# Patient Record
Sex: FEMALE | Employment: OTHER | URBAN - METROPOLITAN AREA
[De-identification: names, ages, dates, MRNs, and addresses within clinical notes are randomized per-mention and may not be internally consistent; named-entity substitution may affect disease eponyms.]

---

## 2017-04-28 ENCOUNTER — OFFICE VISIT (OUTPATIENT)
Dept: HEMATOLOGY | Age: 62
End: 2017-04-28

## 2017-04-28 VITALS
SYSTOLIC BLOOD PRESSURE: 139 MMHG | TEMPERATURE: 96.9 F | BODY MASS INDEX: 29.57 KG/M2 | OXYGEN SATURATION: 96 % | WEIGHT: 188.38 LBS | HEIGHT: 67 IN | DIASTOLIC BLOOD PRESSURE: 85 MMHG | HEART RATE: 86 BPM

## 2017-04-28 DIAGNOSIS — B18.2 CHRONIC HEPATITIS C WITHOUT HEPATIC COMA (HCC): Primary | ICD-10-CM

## 2017-04-28 PROBLEM — I10 HYPERTENSION: Status: ACTIVE | Noted: 2017-04-28

## 2017-04-28 PROBLEM — N20.0 KIDNEY STONES: Status: ACTIVE | Noted: 2017-04-28

## 2017-04-28 RX ORDER — LOSARTAN POTASSIUM AND HYDROCHLOROTHIAZIDE 25; 100 MG/1; MG/1
1 TABLET ORAL DAILY
COMMUNITY
Start: 2017-04-25

## 2017-04-28 RX ORDER — METOPROLOL TARTRATE 25 MG/1
25 TABLET, FILM COATED ORAL 2 TIMES DAILY
COMMUNITY
Start: 2017-02-02

## 2017-04-28 RX ORDER — ALPRAZOLAM 0.25 MG/1
0.25 TABLET ORAL
COMMUNITY
Start: 2017-03-23

## 2017-04-28 NOTE — PROGRESS NOTES
93 Micky Valencia MD, [address]     April Maritza Bennett, SAYDA Cruz MD, [address], [place] Val Mukherjee NP Joyice Daring, NP [facility]     [address] [facility], [address] Oniel Isaac  22.     [phone]     FAX: [address] [place] [address]     [address], [address]     98 Maria Fernanda Zuluaga, [address]     [phone]     FAX: [phone]         Patient Care Team:  Feli Duncan MD as PCP - General (Family Practice)      Problem List  Date Reviewed: 4/28/2017          Codes Class Noted    Chronic hepatitis C [facility]) ICD-10-CM: B18.2  ICD-9-CM: 070.54  4/28/2017        Hypertension ICD-10-CM: I10  ICD-9-CM: 401.9  4/28/2017        Kidney stones ICD-10-CM: N20.0  ICD-9-CM: 592.0  4/28/2017                The physicians listed above have asked me to see Juan Gloria in consultation regarding chronic HCV and its management. All medical records sent by the referring physicians were reviewed     The patient is a 62 y.o.  female who tested positive for chronic HCV in 2006 when she donated blood. Risk factors for acquiring HCV are inhaling cocaine in 1970s. There was no history of acute incteric hepatitis at the time of these risk factors. Imaging of the liver was not recently performed. The patient was referred to see me at [facility] in the 2006. A liver biopsy was performed in 2006 and 2008. The results are not available at this time. The patient was treated with peginterferon and ribavirin in 2006. There was a non-response. In about 2008 she was enrolled in the phase 2 telaprevir clinical trial.  She responded but then relapsed.     The most recent laboratory studies indicate that the liver transaminases are elevated, alkaline phosphatase is normal, tests of hepatic synthetic and metabolic function are normal, and the platelet count is normal.      The patient has no symptoms which can be attributed to the liver disorder. The patient has not experienced fatigue, pain in the right side over the liver,     The patient completes all daily activities without any functional limitations. ALLERGIES  Allergies   Allergen Reactions    Latex Rash    Clindamycin Rash    Sulfa (Sulfonamide Antibiotics) Rash       MEDICATIONS  Current Outpatient Prescriptions   Medication Sig    ALPRAZolam (XANAX) 0.25 mg tablet     losartan-hydroCHLOROthiazide (HYZAAR) 100-25 mg per tablet     metoprolol tartrate (LOPRESSOR) 25 mg tablet      No current facility-administered medications for this visit. SYSTEM REVIEW NOT RELATED TO LIVER DISEASE OR REVIEWED ABOVE:  Constitution systems: Negative for fever, chills, weight gain, weight loss. Eyes: Negative for visual changes. ENT: Negative for sore throat, painful swallowing. Respiratory: Negative for cough, hemoptysis, SOB. Cardiology: Negative for chest pain, palpitations. GI:  Negative for constipation or diarrhea. : Negative for urinary frequency, dysuria, hematuria, nocturia. Skin: Negative for rash. Hematology: Negative for easy bruising, blood clots. Musculo-skelatal: Negative for back pain, muscle pain, weakness. Neurologic: Negative for headaches, dizziness, vertigo, memory problems not related to HE. Psychology: Negative for anxiety, depression. FAMILY HISTORY:  The father  of MI. The mother has the following chronic diseases: heart disease. There is no family history of liver disease. SOCIAL HISTORY:  The patient is . The patient has 1 child, 1 stepchild and 9 grandchildren. The patient stopped using tobacco products in . The patient consumes 3 alcoholic beverages per week. 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         PHYSICAL EXAMINATION:  /85  Pulse 86  Temp 96.9 °F (36.1 °C) (Tympanic) Ht 5' 7\" (1.702 m)  Wt 188 lb 6 oz (85.4 kg)  SpO2 96%  BMI 29.5 kg/m2  General: No acute distress. Eyes: Sclera anicteric. ENT: No oral lesions. Thyroid normal.  Nodes: No adenopathy. Skin: No spider angiomata. No jaundice. No palmar erythema. Respiratory: Lungs clear to auscultation. Cardiovascular: Regular heart rate. No murmurs. No JVD. Abdomen: Soft non-tender. Liver size normal to percussion/palpation. Spleen not palpable. No obvious ascites. Extremities: No edema. No muscle wasting. No gross arthritic changes. Neurologic: Alert and oriented. Cranial nerves grossly intact. No asterixis. LABORATORY STUDIES:  From 3/2017  AST/ALT/ALP/T Bili/ALB:  105/193/85/0.8/4.3  WBC/HB/PLT/INR:  6.1/14.0/270  BUN/CREAT:  15/0.6    SEROLOGIES:  Not available or performed. Testing was performed today. LIVER HISTOLOGY:  Not available or performed    ENDOSCOPIC PROCEDURES:  Not available or performed    RADIOLOGY:  Not available or performed    OTHER TESTING:  Not available or performed    ASSESSMENT AND PLAN:  Chronic HCV of unclear severity. Liver function is normal.  The platelet count is normal.      Will perform laboratory testing to monitor liver function and degree of liver injury. This included BMP, hepatic panel, CBC with platelet count,     Will perform and/or review results of HCV viral load and HCV genotype to define the specific treatment and duration of treatment that will be required. Will perform serologic and virologic studies to assess for other causes of chronic liver disease. Will perform imaging of the liver with ultrasound. The need to perform an assessment of liver fibrosis was discussed with the patient. Liver biopsy has been used to assess the degree of fibrosis and the need for HCV treatment. The risks of performing the liver biopsy including pain, puncture of the lung, gallbladder, intestine or kidney and bleeding were discussed.       The Fibroscan can assess liver fibrosis and determine if a patient has advanced fibrosis or cirrhosis without the need for liver biopsy. The Fibroscan is currently available at [facility] [address]. This will be scheduled. The patient was previously treated for HCV with peginterferon and ribavirin. There was a non-response that was not clearly defined. She was then treated with PEGINF, RBV and Telaprevir in the phase 2 clinical trial.  She had a virologic response with breakthrough or relapsed. Given that she was in the telaprevir clinical trial she must be HCV genotype 1. Discussed the treatment alternatives. The SVR/cure rate for HCV now exceeds 90% with just oral anti-viral therapy and no interferon injections or significant side effects for most patients with HCV. The specific treatment is dependent upon genotype, viral load and histology. The patient should be treated with Michael Lima or [employer]. The SVR/cure rate for either of these regimens exceeds 95%. The patient was directed to continue all current medications at the current dosages. There are no contraindications for the patient to take any medications that are necessary for treatment of other medical issues. The patient was counseled regarding alcohol consumption. The need for vaccination against viral hepatitis A and B will be assessed with serologic and instituted as appropriate. All of the above issues were discussed with the patient. All questions were answered. The patient expressed a clear understanding of the above. Follow-up [facility] [address] [facility] for [address], and to initiate HCV treatment.     Kain Hsieh MD  [facility] [address] [address] [facility] [address] [facility] [place]  22.  [phone]

## 2017-04-28 NOTE — MR AVS SNAPSHOT
Visit Information Date & Time Provider Department Dept. Phone Encounter #  
 4/28/2017  3:30 PM Rizwana Perry MD [facility] [address] [place] [address] [id number] Follow-up Instructions Return for FS with April. Upcoming Health Maintenance Date Due Hepatitis C Screening 1955 DTaP/Tdap/Td series (1 - Tdap) 6/19/1977 PAP AKA CERVICAL CYTOLOGY 6/19/1977 BREAST CANCER SCRN MAMMOGRAM 6/19/2006 FOBT Q 1 YEAR AGE 50-75 6/19/2006 ZOSTER VACCINE AGE 60> 6/19/2016 INFLUENZA AGE 9 TO ADULT 8/1/2016 Allergies as of 4/28/2017  Never Reviewed Severity Noted Reaction Type Reactions Latex  04/28/2017    Rash Clindamycin  04/28/2017    Rash  
 Sulfa (Sulfonamide Antibiotics)  04/28/2017    Rash Current Immunizations  Never Reviewed No immunizations on file. Not reviewed this visit You Were Diagnosed With   
  
 Codes Comments Chronic hepatitis C without hepatic coma (HCC)    -  Primary ICD-10-CM: B18.2 ICD-9-CM: 070.54 Vitals BP Pulse Temp Height(growth percentile) Weight(growth percentile) SpO2  
 139/85 86 96.9 °F (36.1 °C) (Tympanic) 5' 7\" (1.702 m) 188 lb 6 oz (85.4 kg) 96% BMI Smoking Status 29.5 kg/m2 Never Smoker BMI and BSA Data Body Mass Index Body Surface Area  
 29.5 kg/m 2 2.01 m 2 Your Updated Medication List  
  
   
This list is accurate as of: 4/28/17  4:11 PM.  Always use your most recent med list.  
  
  
  
  
 ALPRAZolam 0.25 mg tablet Commonly known as:  XANAX  
  
 losartan-hydroCHLOROthiazide 100-25 mg per tablet Commonly known as:  HYZAAR  
  
 metoprolol tartrate 25 mg tablet Commonly known as:  LOPRESSOR We Performed the Following CBC WITH AUTOMATED DIFF [78101 CPT(R)] HCV NS5A DRUG RESISTANCE ASSAY [[id number] Custom] HCV RNA BY ZEINAB QL,RFLX TO QT [63146 CPT(R)] HEP A AB, TOTAL F283496 CPT(R)] HEP B SURFACE AB [id number] CPT(R)] HEP B SURFACE AG [id number] CPT(R)] HEP C GENOTYPE [id number] CPT(R)] HEPATIC FUNCTION PANEL [40320 CPT(R)] HEPATITIS B CORE AB, TOTAL [id number] CPT(R)] METABOLIC PANEL, BASIC [46653 CPT(R)] Follow-up Instructions Return for FS with April. To-Do List   
 04/28/2017 Imaging:  US ABD LTD   
  
 05/25/2017 11:00 AM  
  Appointment with Ramona Woodall NP at [facility] ([phone]) Introducing [facility] & HEALTH SERVICES! [employer] introduces [employer] patient portal. Now you can access parts of your medical record, email your doctor's office, and request medication refills online. 1. In your internet browser, go to [email or web address] 2. Click on the First Time User? Click Here link in the Sign In box. You will see the New Member Sign Up page. 3. Enter your [employer] Access Code exactly as it appears below. You will not need to use this code after youve completed the sign-up process. If you do not sign up before the expiration date, you must request a new code. · [employer] Access Code: [email or web address] Expires: 7/27/2017  4:06 PM 
 
4. Enter the last four digits of your Social Security Number (xxxx) and Date of Birth (mm/dd/yyyy) as indicated and click Submit. You will be taken to the next sign-up page. 5. Create a [facility] ID. This will be your [employer]zeda login ID and cannot be changed, so think of one that is secure and easy to remember. 6. Create a [employer] password. You can change your password at any time. 7. Enter your Password Reset Question and Answer. This can be used at a later time if you forget your password. 8. Enter your e-mail address. You will receive e-mail notification when new information is available in [address] 9. Click Sign Up. You can now view and download portions of your medical record.  
10. Click the Download Summary menu link to download a portable copy of your medical information. If you have questions, please visit the Frequently Asked Questions section of the [employer] website. Remember, [employer] is NOT to be used for urgent needs. For medical emergencies, dial 911. Now available from your iPhone and Android! Please provide this summary of care documentation to your next provider. Your primary care clinician is listed as Mabel Farooq. If you have any questions after today's visit, please call [phone].

## 2017-04-29 LAB
ALBUMIN SERPL-MCNC: 4.6 G/DL (ref 3.6–4.8)
ALP SERPL-CCNC: 100 IU/L (ref 39–117)
ALT SERPL-CCNC: 212 IU/L (ref 0–32)
AST SERPL-CCNC: 138 IU/L (ref 0–40)
BASOPHILS # BLD AUTO: 0 X10E3/UL (ref 0–0.2)
BASOPHILS NFR BLD AUTO: 0 %
BILIRUB DIRECT SERPL-MCNC: 0.25 MG/DL (ref 0–0.4)
BILIRUB SERPL-MCNC: 0.6 MG/DL (ref 0–1.2)
BUN SERPL-MCNC: 18 MG/DL (ref 8–27)
BUN/CREAT SERPL: 24 (ref 12–28)
CALCIUM SERPL-MCNC: 10 MG/DL (ref 8.7–10.3)
CHLORIDE SERPL-SCNC: 94 MMOL/L (ref 96–106)
CO2 SERPL-SCNC: 23 MMOL/L (ref 18–29)
CREAT SERPL-MCNC: 0.74 MG/DL (ref 0.57–1)
EOSINOPHIL # BLD AUTO: 0.2 X10E3/UL (ref 0–0.4)
EOSINOPHIL NFR BLD AUTO: 2 %
ERYTHROCYTE [DISTWIDTH] IN BLOOD BY AUTOMATED COUNT: 12.7 % (ref 12.3–15.4)
GLUCOSE SERPL-MCNC: 86 MG/DL (ref 65–99)
HAV AB SER QL IA: POSITIVE
HBV CORE AB SERPL QL IA: NEGATIVE
HBV SURFACE AB SER QL: NON REACTIVE
HBV SURFACE AG SERPL QL IA: NEGATIVE
HCT VFR BLD AUTO: 42.9 % (ref 34–46.6)
HGB BLD-MCNC: 14.5 G/DL (ref 11.1–15.9)
IMM GRANULOCYTES # BLD: 0 X10E3/UL (ref 0–0.1)
IMM GRANULOCYTES NFR BLD: 0 %
LYMPHOCYTES # BLD AUTO: 3.1 X10E3/UL (ref 0.7–3.1)
LYMPHOCYTES NFR BLD AUTO: 39 %
MCH RBC QN AUTO: 33.5 PG (ref 26.6–33)
MCHC RBC AUTO-ENTMCNC: 33.8 G/DL (ref 31.5–35.7)
MCV RBC AUTO: 99 FL (ref 79–97)
MONOCYTES # BLD AUTO: 0.8 X10E3/UL (ref 0.1–0.9)
MONOCYTES NFR BLD AUTO: 10 %
NEUTROPHILS # BLD AUTO: 3.8 X10E3/UL (ref 1.4–7)
NEUTROPHILS NFR BLD AUTO: 49 %
PLATELET # BLD AUTO: 328 X10E3/UL (ref 150–379)
POTASSIUM SERPL-SCNC: 4 MMOL/L (ref 3.5–5.2)
PROT SERPL-MCNC: 7.4 G/DL (ref 6–8.5)
RBC # BLD AUTO: 4.33 X10E6/UL (ref 3.77–5.28)
SODIUM SERPL-SCNC: 136 MMOL/L (ref 134–144)
WBC # BLD AUTO: 8 X10E3/UL (ref 3.4–10.8)

## 2017-05-01 LAB
HCV GENTYP SERPL NAA+PROBE: NORMAL
HCV RNA SERPL NAA+PROBE-ACNC: NORMAL IU/ML
HCV RNA SERPL NAA+PROBE-LOG IU: 6.59 LOG10 IU/ML
HCV RNA SERPL QL NAA+PROBE: POSITIVE
PLEASE NOTE, 550474: NORMAL
TEST INFORMATION: NORMAL

## 2017-05-08 LAB
HCV NS5 MUT DET ISLT GENOTYP: NORMAL
HCV RESIS PANELL ISLT GENOTYP: NORMAL
REF LAB TEST METHOD: NORMAL

## 2017-05-25 ENCOUNTER — OFFICE VISIT (OUTPATIENT)
Dept: HEMATOLOGY | Age: 62
End: 2017-05-25

## 2017-05-25 VITALS
BODY MASS INDEX: 29.48 KG/M2 | HEART RATE: 64 BPM | DIASTOLIC BLOOD PRESSURE: 84 MMHG | OXYGEN SATURATION: 95 % | SYSTOLIC BLOOD PRESSURE: 145 MMHG | TEMPERATURE: 95.7 F | WEIGHT: 188.2 LBS

## 2017-05-25 DIAGNOSIS — B18.2 CHRONIC HEPATITIS C WITHOUT HEPATIC COMA (HCC): Primary | ICD-10-CM

## 2017-05-25 RX ORDER — LEDIPASVIR AND SOFOSBUVIR 90; 400 MG/1; MG/1
1 TABLET, FILM COATED ORAL DAILY
Qty: 28 TAB | Refills: 2 | Status: SHIPPED | OUTPATIENT
Start: 2017-05-25 | End: 2017-09-12 | Stop reason: ALTCHOICE

## 2017-05-25 NOTE — PROGRESS NOTES
[address] Lisseth Cerrato MD, [address]     April Dustin Sindi, SAYDA Rios MD, [address], [place], [place]     [address], [place] Mora NP        [address], [address] Oniel Isaac  22.     [phone]     FAX: [address] [place] [address] Drive, [address] [facility], [address]     [phone]     FAX: [phone]     Patient Care Team:  Bernard Steele MD as PCP - General (Family Practice)    Patient Active Problem List   Diagnosis Code    Hypertension I10    Kidney stones N20.0    Chronic hepatitis C without hepatic coma ([facility] [address] B18.2       Radha Fernandez returns to the [place] [address] regarding chronic HCV and its management. The active problem list, all pertinent past medical history, medications, radiologic findings and laboratory findings related to the liver disorder were reviewed with the patient. The patient is a 62 y.o.  female who tested positive for chronic HCV in 2006 when she donated blood. Risk factors for acquiring HCV are inhaling cocaine in 1970s. The patient had been followed at [facility] since 2006. A liver biopsy was performed in 2006 and 2008. The results are not available at this time. The patient was treated with peginterferon and ribavirin in 2006. There was a non-response. In about 2008, she was enrolled in the phase 2 telaprevir clinical trial. She responded but then relapsed. Patient returns to the clinic today to discuss current HCV treatment options. Patient presents to the clinic today for a Fibroscan to assess liver fibrosis or scarring. The patient has no symptoms which can be attributed to the liver disorder. Today, patient denies abdominal pain, change in bowel habits, dark urine, myalgias, arthralgias, pruritus and problems concentrating.  The patient completes all daily activities without any functional limitations. ALLERGIES  Allergies   Allergen Reactions    Latex Rash    Clindamycin Rash    Sulfa (Sulfonamide Antibiotics) Rash     MEDICATIONS  Current Outpatient Prescriptions   Medication Sig    ALPRAZolam (XANAX) 0.25 mg tablet Take 0.25 mg by mouth daily as needed.  losartan-hydroCHLOROthiazide (HYZAAR) 100-25 mg per tablet Take 1 Tab by mouth daily.  metoprolol tartrate (LOPRESSOR) 25 mg tablet Take 25 mg by mouth two (2) times a day. No current facility-administered medications for this visit. SYSTEM REVIEW NOT RELATED TO LIVER DISEASE OR REVIEWED ABOVE:  Constitution systems: Negative for fever, chills, weight gain, weight loss. Eyes: Negative for visual changes. ENT: Negative for sore throat, painful swallowing. Respiratory: Negative for cough, hemoptysis, SOB. Cardiology: Negative for chest pain, palpitations. GI:  Negative for constipation or diarrhea. : Negative for urinary frequency, dysuria, hematuria, nocturia. Skin: Negative for rash. Hematology: Negative for easy bruising, blood clots. Musculo-skelatal: Negative for back pain, muscle pain, weakness. Neurologic: Negative for headaches, dizziness, vertigo, memory problems not related to HE. Psychology: Negative for anxiety, depression. FAMILY HISTORY:  The father  of MI. The mother has the following chronic diseases: heart disease. There is no family history of liver disease. SOCIAL HISTORY:  The patient is . The patient has 1 child, 1 stepchild and 9 grandchildren. The patient stopped using tobacco products in . The patient consumes 3 alcoholic beverages per week. 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 PHYSICAL EXAMINATION:  /84  Pulse 64  Temp 95.7 °F (35.4 °C) (Tympanic)   Wt 188 lb 3.2 oz (85.4 kg)  SpO2 95%  BMI 29.48 kg/m2  General: No acute distress. Eyes: Sclera anicteric. ENT: No oral lesions. Thyroid normal.  Nodes: No adenopathy. Skin: No spider angiomata. No jaundice. No palmar erythema. Respiratory: Lungs clear to auscultation. Cardiovascular: Regular heart rate. No murmurs. No JVD. Abdomen: Soft non-tender. Liver size normal to percussion/palpation. Spleen not palpable. No obvious ascites. Extremities: No edema. No muscle wasting. No gross arthritic changes. Neurologic: Alert and oriented. Cranial nerves grossly intact. No asterixis. LABORATORY STUDIES:  L[facility] [place] of [address] [place] [address] Units 4/28/2017   WBC 3.4 - 10.8 x10E3/uL 8.0   ANC 1.4 - 7.0 x10E3/uL 3.8   HGB 11.1 - 15.9 g/dL 14.5    - 379 x10E3/uL 328   AST 0 - 40 IU/L 138 (H)   ALT 0 - 32 IU/L 212 (H)   Alk Phos 39 - 117 IU/L 100   Bili, Total 0.0 - 1.2 mg/dL 0.6   Bili, Direct 0.00 - 0.40 mg/dL 0.25   Albumin 3.6 - 4.8 g/dL 4.6   BUN 8 - 27 mg/dL 18   Creat 0.57 - 1.00 mg/dL 0.74   Na 134 - 144 mmol/L 136   K 3.5 - 5.2 mmol/L 4.0   Cl 96 - 106 mmol/L 94 (L)   CO2 18 - 29 mmol/L 23   Glucose 65 - 99 mg/dL 86     From 3/2017  AST/ALT/ALP/T Bili/ALB:  105/193/85/0.8/4.3  WBC/HB/PLT/INR:  6.1/14.0/270  BUN/CREAT:  15/0.6    SEROLOGIES:  Serologies Latest Ref Rng & Units 4/28/2017   Hep A Ab, Total Negative Positive (A)   Hep B Surface Ag Negative Negative   Hep B Core Ab, Total Negative Negative   Hep B Surface AB QL  Non Reactive   Hep C Genotype  1a   HCV RT-PCR, Quant IU/mL [id number]     LIVER HISTOLOGY:  5/25/2017. FibroScan performed at [address]. EkPa was 7.0. Suggested fibrosis level is F1.    ENDOSCOPIC PROCEDURES:  Not available or performed    RADIOLOGY:  Not available or performed    OTHER TESTING:  Not available or performed    ASSESSMENT AND PLAN:  Chronic HCV with portal fibrosis. This was confirmed with FibroScan today. Results were discussed in detail with patient. Liver function is normal. The platelet count is normal.      HCV treatment.  Relapser to PEG/RBV/TEL treatment in the past. The patient has HCV genotype 1A with no RASs. Discussed treatment alternatives. Recommended treatment with sofosbuvir/ledipasvir for 12 weeks. Side effects, cure rate, visit schedule and duration of therapy was discussed thoroughly with patient today (25 minutes total, more than half the office visit). Andrew Chafe will be ordered. The patient was directed to continue all current medications at the current dosages. There are no contraindications for the patient to take any medications that are necessary for treatment of other medical issues. The patient was counseled regarding alcohol consumption. The need for vaccination against viral hepatitis A and B will be assessed with serologic and instituted as appropriate. All of the above issues were discussed with the patient. All questions were answered. The patient expressed a clear understanding of the above. [address] [place] [address] in 4 weeks after initiating HCV treatment.     Miguel Mcdonnell NP  [address] [address]  Ph: [phone]  Fax: [phone]

## 2017-07-11 ENCOUNTER — OFFICE VISIT (OUTPATIENT)
Dept: HEMATOLOGY | Age: 62
End: 2017-07-11

## 2017-07-11 VITALS
OXYGEN SATURATION: 91 % | DIASTOLIC BLOOD PRESSURE: 75 MMHG | SYSTOLIC BLOOD PRESSURE: 143 MMHG | HEART RATE: 68 BPM | BODY MASS INDEX: 28.88 KG/M2 | TEMPERATURE: 95.5 F | WEIGHT: 184.4 LBS

## 2017-07-11 DIAGNOSIS — B18.2 CHRONIC HEPATITIS C WITHOUT HEPATIC COMA (HCC): Primary | ICD-10-CM

## 2017-07-11 NOTE — MR AVS SNAPSHOT
Visit Information Date & Time Provider Department Dept. Phone Encounter #  
 7/11/2017  3:00 PM April Agustin Quezada NP [facility] [address] [place] [address] [id number] Follow-up Instructions Return in about 2 months (around 9/11/2017). Upcoming Health Maintenance Date Due Pneumococcal 19-64 Medium Risk (1 of 1 - PPSV23) 6/19/1975 DTaP/Tdap/Td series (1 - Tdap) 6/19/1977 PAP AKA CERVICAL CYTOLOGY 6/19/1977 BREAST CANCER SCRN MAMMOGRAM 6/19/2006 FOBT Q 1 YEAR AGE 50-75 6/19/2006 ZOSTER VACCINE AGE 60> 6/19/2016 INFLUENZA AGE 9 TO ADULT 8/1/2017 Allergies as of 7/11/2017  Review Complete On: 7/11/2017 By: Madeleine Miller Severity Noted Reaction Type Reactions Latex  04/28/2017    Rash Clindamycin  04/28/2017    Rash  
 Sulfa (Sulfonamide Antibiotics)  04/28/2017    Rash Current Immunizations  Never Reviewed No immunizations on file. Not reviewed this visit You Were Diagnosed With   
  
 Codes Comments Chronic hepatitis C without hepatic coma (HCC)    -  Primary ICD-10-CM: B18.2 ICD-9-CM: 070.54 Vitals BP Pulse Temp Weight(growth percentile) SpO2 BMI  
 143/75 68 95.5 °F (35.3 °C) (Oral) 184 lb 6.4 oz (83.6 kg) 91% 28.88 kg/m2 Smoking Status Never Smoker BMI and BSA Data Body Mass Index Body Surface Area  
 28.88 kg/m 2 1.99 m 2 Preferred Pharmacy Pharmacy Name Phone [address]. [address] [place] [address] Rd) - [address] [place] [address] [phone] Your Updated Medication List  
  
   
This list is accurate as of: 7/11/17  3:24 PM.  Always use your most recent med list.  
  
  
  
  
 ALPRAZolam 0.25 mg tablet Commonly known as:  Leshayes Biggsawls Take 0.25 mg by mouth daily as needed. ledipasvir-sofosbuvir  mg Tab Commonly known as:  Bijan Fischer Take 1 Tab by mouth daily.  Indications: CHRONIC HEPATITIS C - GENOTYPE 1  
  
 losartan-hydroCHLOROthiazide 100-25 mg per tablet Commonly known as:  HYZAAR Take 1 Tab by mouth daily. metoprolol tartrate 25 mg tablet Commonly known as:  LOPRESSOR Take 25 mg by mouth two (2) times a day. We Performed the Following HCV RNA BY ZEINAB QL,RFLX TO QT [12927 CPT(R)] HEPATIC FUNCTION PANEL [02187 CPT(R)] Follow-up Instructions Return in about 2 months (around 9/11/2017). Introducing [facility] & HEALTH SERVICES! Tina Saucedo introduces [employer] patient portal. Now you can access parts of your medical record, email your doctor's office, and request medication refills online. 1. In your internet browser, go to [email or web address] 2. Click on the First Time User? Click Here link in the Sign In box. You will see the New Member Sign Up page. 3. Enter your [employer] Access Code exactly as it appears below. You will not need to use this code after youve completed the sign-up process. If you do not sign up before the expiration date, you must request a new code. · [employer] Access Code: [email or web address] Expires: 7/27/2017  4:06 PM 
 
4. Enter the last four digits of your Social Security Number (xxxx) and Date of Birth (mm/dd/yyyy) as indicated and click Submit. You will be taken to the next sign-up page. 5. Create a [employer] ID. This will be your [employer] login ID and cannot be changed, so think of one that is secure and easy to remember. 6. Create a [employer] password. You can change your password at any time. 7. Enter your Password Reset Question and Answer. This can be used at a later time if you forget your password. 8. Enter your e-mail address. You will receive e-mail notification when new information is available in [address] 9. Click Sign Up. You can now view and download portions of your medical record. 10. Click the Download Summary menu link to download a portable copy of your medical information. If you have questions, please visit the Frequently Asked Questions section of the [employer] website. Remember, [employer] is NOT to be used for urgent needs. For medical emergencies, dial 911. Now available from your iPhone and Android! Please provide this summary of care documentation to your next provider. Your primary care clinician is listed as Mabel Farooq. If you have any questions after today's visit, please call [phone].

## 2017-07-12 LAB
ALBUMIN SERPL-MCNC: 4.4 G/DL (ref 3.6–4.8)
ALP SERPL-CCNC: 88 IU/L (ref 39–117)
ALT SERPL-CCNC: 55 IU/L (ref 0–32)
AST SERPL-CCNC: 38 IU/L (ref 0–40)
BILIRUB DIRECT SERPL-MCNC: 0.14 MG/DL (ref 0–0.4)
BILIRUB SERPL-MCNC: 0.4 MG/DL (ref 0–1.2)
PROT SERPL-MCNC: 7 G/DL (ref 6–8.5)

## 2017-07-12 NOTE — PROGRESS NOTES
[address] Lisseth Cerrato MD, VIRGINIA Howell PA-C Atlee Fontana, NP        at [address], [id number] [place] [address], [address]     [phone]     FAX: [phone]    at [address], [address] [place] [address], [address]     [phone]     FAX: [phone]     Patient Care Team:  Triston Cobian MD as PCP - General (Family Practice)    Patient Active Problem List   Diagnosis Code    Hypertension I10    Kidney stones N20.0    [facility] ([facility] B18.2       Alber Rodriguez returns to the [place] [address] regarding chronic HCV and its management. The active problem list, all pertinent past medical history, medications, radiologic findings and laboratory findings related to the liver disorder were reviewed with the patient. The patient is a 65 y.o.  female who tested positive for chronic HCV in 2006 when she donated blood. Risk factors for acquiring HCV are inhaling cocaine in 1970s. Patient is currently undergoing HCV treatment with Harvoni x 4 weeks (started-6/7/2017). She has done well on treatment with no significant side effects. She has missed no medications since starting treatment. She returns to the clinic today to monitor her response to treatment. The patient has no symptoms which can be attributed to the liver disorder. Today, patient denies abdominal pain, change in bowel habits, dark urine, myalgias, arthralgias, pruritus and problems concentrating. The patient completes all daily activities without any functional limitations.     ALLERGIES  Allergies   Allergen Reactions    Latex Rash    Clindamycin Rash    Sulfa (Sulfonamide Antibiotics) Rash     MEDICATIONS  Current Outpatient Prescriptions   Medication Sig    ledipasvir-sofosbuvir (HARVONI)  mg tab Take 1 Tab by mouth daily. Indications: CHRONIC HEPATITIS C - GENOTYPE 1    ALPRAZolam (XANAX) 0.25 mg tablet Take 0.25 mg by mouth daily as needed.  losartan-hydroCHLOROthiazide (HYZAAR) 100-25 mg per tablet Take 1 Tab by mouth daily.  metoprolol tartrate (LOPRESSOR) 25 mg tablet Take 25 mg by mouth two (2) times a day. No current facility-administered medications for this visit. SYSTEM REVIEW NOT RELATED TO LIVER DISEASE OR REVIEWED ABOVE:  Constitution systems: Negative for fever, chills, weight gain, weight loss. Eyes: Negative for visual changes. ENT: Negative for sore throat, painful swallowing. Respiratory: Negative for cough, hemoptysis, SOB. Cardiology: Negative for chest pain, palpitations. GI:  Negative for constipation or diarrhea. : Negative for urinary frequency, dysuria, hematuria, nocturia. Skin: Negative for rash. Hematology: Negative for easy bruising, blood clots. Musculo-skelatal: Negative for back pain, muscle pain, weakness. Neurologic: Negative for headaches, dizziness, vertigo, memory problems not related to HE. Psychology: Negative for anxiety, depression. FAMILY HISTORY:  The father  of MI. The mother has the following chronic diseases: heart disease. There is no family history of liver disease. SOCIAL HISTORY:  The patient is . The patient has 1 child, 1 stepchild and 9 grandchildren. The patient stopped using tobacco products in . The patient consumes 3 alcoholic beverages per week. 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 PHYSICAL EXAMINATION:  /75  Pulse 68  Temp 95.5 °F (35.3 °C) (Oral)   Wt 184 lb 6.4 oz (83.6 kg)  SpO2 91%  BMI 28.88 kg/m2  General: No acute distress. Eyes: Sclera anicteric. ENT: No oral lesions. Thyroid normal.  Nodes: No adenopathy. Skin: No spider angiomata. No jaundice. No palmar erythema. Respiratory: Lungs clear to auscultation.    Cardiovascular: Regular heart rate.  No murmurs. No JVD. Abdomen: Soft non-tender. Liver size normal to percussion/palpation. Spleen not palpable. No obvious ascites. Extremities: No edema. No muscle wasting. No gross arthritic changes. Neurologic: Alert and oriented. Cranial nerves grossly intact. No asterixis. LABORATORY STUDIES:  Liver [facility] Ref Rng & Units 7/11/2017 4/28/2017   WBC 3.4 - 10.8 x10E3/uL  8.0   ANC 1.4 - 7.0 x10E3/uL  3.8   HGB 11.1 - 15.9 g/dL  14.5    - 379 x10E3/uL  328   AST 0 - 40 IU/L 38 138 (H)   ALT 0 - 32 IU/L 55 (H) 212 (H)   Alk Phos 39 - 117 IU/L 88 100   Bili, Total 0.0 - 1.2 mg/dL 0.4 0.6   Bili, Direct 0.00 - 0.40 mg/dL 0.14 0.25   Albumin 3.6 - 4.8 g/dL 4.4 4.6   BUN 8 - 27 mg/dL  18   Creat 0.57 - 1.00 mg/dL  0.74   Na 134 - 144 mmol/L  136   K 3.5 - 5.2 mmol/L  4.0   Cl 96 - 106 mmol/L  94 (L)   CO2 18 - 29 mmol/L  23   Glucose 65 - 99 mg/dL  86     SEROLOGIES:  Serologies Latest Ref Rng & Units 4/28/2017   Hep A Ab, Total Negative Positive (A)   Hep B Surface Ag Negative Negative   Hep B Core Ab, Total Negative Negative   Hep B Surface AB QL  Non Reactive   Hep C Genotype  1a   HCV RT-PCR, Quant IU/mL [id number]     LIVER HISTOLOGY:  5/25/2017. FibroScan performed at [address]. EkPa was 7.0. Suggested fibrosis level is F1.    ENDOSCOPIC PROCEDURES:  Not available or performed    RADIOLOGY:  Not available or performed    OTHER TESTING:  Not available or performed    ASSESSMENT AND PLAN:  Chronic HCV with portal fibrosis. Liver function is normal. The platelet count is normal.      HCV treatment. Relapser to PEG/RBV/TEL treatment in the past. Currently on treatment with Harvoni x 4 weeks. She is tolerating treatment well. Will review HCV RNA when available to monitor response to treatment. She will complete 12 weeks of therapy. The patient was directed to continue all current medications at the current dosages.  There are no contraindications for the patient to take any medications that are necessary for treatment of other medical issues. The patient was counseled regarding alcohol consumption. The need for vaccination against viral hepatitis A and B will be assessed with serologic and instituted as appropriate. All of the above issues were discussed with the patient. All questions were answered. The patient expressed a clear understanding of the above. [address] [place] [address] in 2 months.     Todd Palacio NP  [address] [place] [address], [address] [place] [address]  Ph: [phone]  Fax: [phone]

## 2017-07-14 LAB
HCV RNA SERPL NAA+PROBE-ACNC: <15 IU/ML
HCV RNA SERPL NAA+PROBE-LOG IU: NORMAL LOG10 IU/ML
HCV RNA SERPL QL NAA+PROBE: POSITIVE
TEST INFORMATION: NORMAL

## 2017-09-12 ENCOUNTER — OFFICE VISIT (OUTPATIENT)
Dept: HEMATOLOGY | Age: 62
End: 2017-09-12

## 2017-09-12 VITALS
HEART RATE: 66 BPM | TEMPERATURE: 97.4 F | DIASTOLIC BLOOD PRESSURE: 61 MMHG | SYSTOLIC BLOOD PRESSURE: 117 MMHG | WEIGHT: 188.6 LBS | OXYGEN SATURATION: 96 % | BODY MASS INDEX: 29.54 KG/M2

## 2017-09-12 DIAGNOSIS — B18.2 CHRONIC HEPATITIS C WITHOUT HEPATIC COMA (HCC): Primary | ICD-10-CM

## 2017-09-12 NOTE — PROGRESS NOTES
[address] MD, [place], [place] is[place], [place]       VIRGINIA Cevallos PA-C Katharine Keepers, [facility]   VIRGINIA Yancey NP        at [address], [address] [address]     [phone]     FAX: [phone]    at [address], [address] [facility], [address]     [phone]     FAX: [phone]     Patient Care Team:  Jj Couch MD as PCP - General (Family Practice)    Patient Active Problem List   Diagnosis Code    Hypertension I10    Kidney stones N20.0    Chronic hepatitis C without hepatic coma ([facility] B18.2       Alisha Francois returns to the [place] [address] regarding chronic HCV and its management. The active problem list, all pertinent past medical history, medications, radiologic findings and laboratory findings related to the liver disorder were reviewed with the patient. The patient is a 65 y.o.  female who tested positive for chronic HCV in 2006 when she donated blood. Risk factors for acquiring HCV are inhaling cocaine in 1970s. Patient completed 12 weeks of HCV re-treatment with Harvoni (6/2017-9/2017). She did well on treatment with no significant side effects. She missed no medications throughout treatment. She returns to the clinic today to monitor her response to treatment. The patient has no symptoms which can be attributed to the liver disorder. Today, patient denies abdominal pain, change in bowel habits, dark urine, myalgias, arthralgias, pruritus and problems concentrating. The patient completes all daily activities without any functional limitations.     ALLERGIES  Allergies   Allergen Reactions    Latex Rash    Clindamycin Rash    Sulfa (Sulfonamide Antibiotics) Rash     MEDICATIONS  Current Outpatient Prescriptions   Medication Sig    ALPRAZolam (XANAX) 0.25 mg tablet Take 0.25 mg by mouth daily as needed.  losartan-hydroCHLOROthiazide (HYZAAR) 100-25 mg per tablet Take 1 Tab by mouth daily.  metoprolol tartrate (LOPRESSOR) 25 mg tablet Take 25 mg by mouth two (2) times a day. No current facility-administered medications for this visit. SYSTEM REVIEW NOT RELATED TO LIVER DISEASE OR REVIEWED ABOVE:  Constitution systems: Negative for fever, chills, weight gain, weight loss. Eyes: Negative for visual changes. ENT: Negative for sore throat, painful swallowing. Respiratory: Negative for cough, hemoptysis, SOB. Cardiology: Negative for chest pain, palpitations. GI:  Negative for constipation or diarrhea. : Negative for urinary frequency, dysuria, hematuria, nocturia. Skin: Negative for rash. Hematology: Negative for easy bruising, blood clots. Musculo-skelatal: Negative for back pain, muscle pain, weakness. Neurologic: Negative for headaches, dizziness, vertigo, memory problems not related to HE. Psychology: Negative for anxiety, depression. FAMILY HISTORY:  The father  of MI. The mother has the following chronic diseases: heart disease. There is no family history of liver disease. SOCIAL HISTORY:  The patient is . The patient has 1 child, 1 stepchild and 9 grandchildren. The patient stopped using tobacco products in . The patient consumes 3 alcoholic beverages per week. 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 PHYSICAL EXAMINATION:  /61  Pulse 66  Temp 97.4 °F (36.3 °C) (Tympanic)   Wt 188 lb 9.6 oz (85.5 kg)  SpO2 96%  BMI 29.54 kg/m2  General: No acute distress. Eyes: Sclera anicteric. ENT: No oral lesions. Thyroid normal.  Nodes: No adenopathy. Skin: No spider angiomata. No jaundice. No palmar erythema. Respiratory: Lungs clear to auscultation. Cardiovascular: Regular heart rate. No murmurs. No JVD. Abdomen: Soft non-tender.  Liver size normal to percussion/palpation. Spleen not palpable. No obvious ascites. Extremities: No edema. No muscle wasting. No gross arthritic changes. Neurologic: Alert and oriented. Cranial nerves grossly intact. No asterixis. LABORATORY STUDIES:  Liver [place] [facility] [address] [place] [address] & Units 9/12/2017 7/11/2017    - 379 x10E3/uL     AST 0 - 40 IU/L 27 38   ALT 0 - 32 IU/L 39 (H) 55 (H)   Alk Phos 39 - 117 IU/L 92 88   Bili, Total 0.0 - 1.2 mg/dL 0.4 0.4   Bili, Direct 0.00 - 0.40 mg/dL 0.12 0.14   Albumin 3.6 - 4.8 g/dL 4.3 4.4   BUN 8 - 27 mg/dL     Creat 0.57 - 1.00 mg/dL     Na 134 - 144 mmol/L     K 3.5 - 5.2 mmol/L     Cl 96 - 106 mmol/L     CO2 18 - 29 mmol/L     Glucose 65 - 99 mg/dL     Virology Latest Ref Rng & Units  7/11/2017   HCV RNA, ZEINAB, QL Negative  Positive (A)     A hepatic function panel and HCV RNA were ordered today. Will review results when available. SEROLOGIES:  Serologies Latest Ref Rng & Units 4/28/2017   Hep A Ab, Total Negative Positive (A)   Hep B Surface Ag Negative Negative   Hep B Core Ab, Total Negative Negative   Hep B Surface AB QL  Non Reactive   Hep C Genotype  1a   HCV RT-PCR, Quant IU/mL [id number]     LIVER HISTOLOGY:  5/25/2017. FibroScan performed at [facility]. EkPa was 7.0. Suggested fibrosis level is F1.    ENDOSCOPIC PROCEDURES:  Not available or performed    RADIOLOGY:  Not available or performed    OTHER TESTING:  Not available or performed    ASSESSMENT AND PLAN:  Chronic HCV with portal fibrosis. Liver function is normal. The platelet count is normal.      HCV treatment. Relapser to PEG/RBV/TEL treatment in the past. Completed 12 weeks of Harvoni treatment in early September 2017. Tolerated treatment very well. Will review HCV RNA when available to monitor response to treatment. The patient was directed to continue all current medications at the current dosages.  There are no contraindications for the patient to take any medications that are necessary for treatment of other medical issues. The patient was counseled regarding alcohol consumption. The need for vaccination against viral hepatitis A and B will be assessed with serologic and instituted as appropriate. All of the above issues were discussed with the patient. All questions were answered. The patient expressed a clear understanding of the above. [address] [place] [address] in 3 months.     Joe Merchant NP  [address] [address]  Ph: [phone]  Fax: [phone]

## 2017-09-12 NOTE — MR AVS SNAPSHOT
Visit Information Date & Time Provider Department Dept. Phone Encounter #  
 9/12/2017 10:00 AM April DANIEL Cruz, [facility] 219 [id number] Follow-up Instructions Return in about 3 months (around 12/12/2017). Upcoming Health Maintenance Date Due Pneumococcal 19-64 Medium Risk (1 of 1 - PPSV23) 6/19/1975 DTaP/Tdap/Td series (1 - Tdap) 6/19/1977 PAP AKA CERVICAL CYTOLOGY 6/19/1977 BREAST CANCER SCRN MAMMOGRAM 6/19/2006 FOBT Q 1 YEAR AGE 50-75 6/19/2006 ZOSTER VACCINE AGE 60> 4/19/2016 INFLUENZA AGE 9 TO ADULT 8/1/2017 Allergies as of 9/12/2017  Review Complete On: 9/12/2017 By: Solomon Boone Severity Noted Reaction Type Reactions Latex  04/28/2017    Rash Clindamycin  04/28/2017    Rash  
 Sulfa (Sulfonamide Antibiotics)  04/28/2017    Rash Current Immunizations  Never Reviewed No immunizations on file. Not reviewed this visit You Were Diagnosed With   
  
 Codes Comments Chronic hepatitis C without hepatic coma (HCC)    -  Primary ICD-10-CM: B18.2 ICD-9-CM: 070.54 Vitals BP Pulse Temp Weight(growth percentile) SpO2 BMI  
 117/61 66 97.4 °F (36.3 °C) (Tympanic) 188 lb 9.6 oz (85.5 kg) 96% 29.54 kg/m2 Smoking Status Former Smoker BMI and BSA Data Body Mass Index Body Surface Area  
 29.54 kg/m 2 2.01 m 2 Preferred Pharmacy Pharmacy Name Phone [address] [place] [address] Rd) - [address] [place] [address] [phone] Your Updated Medication List  
  
   
This list is accurate as of: 9/12/17 10:15 AM.  Always use your most recent med list.  
  
  
  
  
 ALPRAZolam 0.25 mg tablet Commonly known as:  Phoebe Kenneth Take 0.25 mg by mouth daily as needed. losartan-hydroCHLOROthiazide 100-25 mg per tablet Commonly known as:  HYZAAR Take 1 Tab by mouth daily. metoprolol tartrate 25 mg tablet Commonly known as:  LOPRESSOR Take 25 mg by mouth two (2) times a day. We Performed the Following HCV RNA BY ZEINAB QL,RFLX TO QT [05751 CPT(R)] HEPATIC FUNCTION PANEL [65948 CPT(R)] Follow-up Instructions Return in about 3 months (around 12/12/2017). Introducing [facility] [place] & HEALTH SERVICES! [facility] introduces [facility] [employer] [facility] patient portal. Now you can access parts of your medical record, email your doctor's office, and request medication refills online. 1. In your internet browser, go to [email or web address] 2. Click on the First Time User? Click Here link in the Sign In box. You will see the New Member Sign Up page. 3. Enter your [employer] Access Code exactly as it appears below. You will not need to use this code after youve completed the sign-up process. If you do not sign up before the expiration date, you must request a new code. · [employer] [facility] Access Code: [email or web address] Expires: 12/11/2017 10:15 AM 
 
4. Enter the last four digits of your Social Security Number (xxxx) and Date of Birth (mm/dd/yyyy) as indicated and click Submit. You will be taken to the next sign-up page. 5. Create a [employer] ID. This will be your [facility] [employer] [facility] login ID and cannot be changed, so think of one that is secure and easy to remember. 6. Create a [employer] [facility] [employer] [facility] password. You can change your password at any time. 7. Enter your Password Reset Question and Answer. This can be used at a later time if you forget your password. 8. Enter your e-mail address. You will receive e-mail notification when new information is available in [address] 9. Click Sign Up. You can now view and download portions of your medical record. 10. Click the Download Summary menu link to download a portable copy of your medical information. If you have questions, please visit the Frequently Asked Questions section of the [employer] [facility] [employer] website.  Remember, [employer] is NOT to be used for urgent needs. For medical emergencies, dial 911. Now available from your iPhone and Android! Please provide this summary of care documentation to your next provider. Your primary care clinician is listed as Mabel Farooq. If you have any questions after today's visit, please call [phone].

## 2017-09-13 LAB
ALBUMIN SERPL-MCNC: 4.3 G/DL (ref 3.6–4.8)
ALP SERPL-CCNC: 92 IU/L (ref 39–117)
ALT SERPL-CCNC: 39 IU/L (ref 0–32)
AST SERPL-CCNC: 27 IU/L (ref 0–40)
BILIRUB DIRECT SERPL-MCNC: 0.12 MG/DL (ref 0–0.4)
BILIRUB SERPL-MCNC: 0.4 MG/DL (ref 0–1.2)
PROT SERPL-MCNC: 7.3 G/DL (ref 6–8.5)

## 2017-09-14 LAB — HCV RNA SERPL QL NAA+PROBE: NEGATIVE

## 2017-12-12 ENCOUNTER — OFFICE VISIT (OUTPATIENT)
Dept: HEMATOLOGY | Age: 62
End: 2017-12-12

## 2017-12-12 VITALS
WEIGHT: 193.2 LBS | BODY MASS INDEX: 30.26 KG/M2 | SYSTOLIC BLOOD PRESSURE: 148 MMHG | OXYGEN SATURATION: 98 % | DIASTOLIC BLOOD PRESSURE: 78 MMHG | HEART RATE: 72 BPM | TEMPERATURE: 97.6 F

## 2017-12-12 DIAGNOSIS — B18.2 CHRONIC HEPATITIS C WITHOUT HEPATIC COMA (HCC): Primary | ICD-10-CM

## 2017-12-12 NOTE — PROGRESS NOTES
Patient presents for a follow up   1. Have you been to the ER, urgent care clinic since your last visit? Hospitalized since your last visit? No    2. Have you seen or consulted any other health care providers outside of the [address] [place] [address] since your last visit? Include any pap smears or colon screening.  No   Visit Vitals    /78    Pulse 72    Temp 97.6 °F (36.4 °C) (Oral)    Wt 193 lb 3.2 oz (87.6 kg)    SpO2 98%    BMI 30.26 kg/m2     PHQ over the last two weeks 12/12/2017   Little interest or pleasure in doing things Not at all   Feeling down, depressed or hopeless Not at all   Total Score PHQ 2 0     Learning Assessment 12/12/2017   PRIMARY LEARNER Patient   PRIMARY LANGUAGE ENGLISH   LEARNER PREFERENCE PRIMARY LISTENING   ANSWERED BY patent    RELATIONSHIP SELF

## 2017-12-12 NOTE — MR AVS SNAPSHOT
Visit Information Date & Time Provider Department Dept. Phone Encounter #  
 12/12/2017 10:30 AM April G Francoise Neil, [facility] [facility] 219 [id number] Follow-up Instructions Return in about 6 months (around 6/12/2018). Upcoming Health Maintenance Date Due Pneumococcal 19-64 Medium Risk (1 of 1 - PPSV23) 6/19/1975 DTaP/Tdap/Td series (1 - Tdap) 6/19/1977 PAP AKA CERVICAL CYTOLOGY 6/19/1977 BREAST CANCER SCRN MAMMOGRAM 6/19/2006 FOBT Q 1 YEAR AGE 50-75 6/19/2006 ZOSTER VACCINE AGE 60> 4/19/2016 Influenza Age 5 to Adult 8/1/2017 Allergies as of 12/12/2017  Review Complete On: 12/12/2017 By: [place] Severity Noted Reaction Type Reactions Latex  04/28/2017    Rash Clindamycin  04/28/2017    Rash  
 Sulfa (Sulfonamide Antibiotics)  04/28/2017    Rash Current Immunizations  Never Reviewed No immunizations on file. Not reviewed this visit You Were Diagnosed With   
  
 Codes Comments Chronic hepatitis C without hepatic coma (HCC)    -  Primary ICD-10-CM: B18.2 ICD-9-CM: 070.54 Vitals BP Pulse Temp Weight(growth percentile) SpO2 BMI  
 148/78 72 97.6 °F (36.4 °C) (Oral) 193 lb 3.2 oz (87.6 kg) 98% 30.26 kg/m2 Smoking Status Former Smoker Vitals History BMI and BSA Data Body Mass Index Body Surface Area  
 30.26 kg/m 2 2.03 m 2 Preferred Pharmacy Pharmacy Name Phone [address] Elodia Flores, [address] [place] [address] [phone] Your Updated Medication List  
  
   
This list is accurate as of: 12/12/17 10:39 AM.  Always use your most recent med list.  
  
  
  
  
 ALPRAZolam 0.25 mg tablet Commonly known as:  Star Carp Take 0.25 mg by mouth daily as needed. losartan-hydroCHLOROthiazide 100-25 mg per tablet Commonly known as:  HYZAAR Take 1 Tab by mouth daily. metoprolol tartrate 25 mg tablet Commonly known as:  LOPRESSOR Take 25 mg by mouth two (2) times a day. We Performed the Following HCV RNA BY ZEINAB QL,RFLX TO QT [64598 CPT(R)] HEPATIC FUNCTION PANEL [45094 CPT(R)] Follow-up Instructions Return in about 6 months (around 6/12/2018). Introducing hospitals & HEALTH SERVICES! [facility] introduces [employer] patient portal. Now you can access parts of your medical record, email your doctor's office, and request medication refills online. 1. In your internet browser, go to [email or web address] 2. Click on the First Time User? Click Here link in the Sign In box. You will see the New Member Sign Up page. 3. Enter your [employer] Access Code exactly as it appears below. You will not need to use this code after youve completed the sign-up process. If you do not sign up before the expiration date, you must request a new code. · [employer] Access Code: [email or web address] Expires: 3/12/2018 10:39 AM 
 
4. Enter the last four digits of your Social Security Number (xxxx) and Date of Birth (mm/dd/yyyy) as indicated and click Submit. You will be taken to the next sign-up page. 5. Create a [employer] ID. This will be your [employer] login ID and cannot be changed, so think of one that is secure and easy to remember. 6. Create a [employer] password. You can change your password at any time. 7. Enter your Password Reset Question and Answer. This can be used at a later time if you forget your password. 8. Enter your e-mail address. You will receive e-mail notification when new information is available in [address] 9. Click Sign Up. You can now view and download portions of your medical record. 10. Click the Download Summary menu link to download a portable copy of your medical information. If you have questions, please visit the Frequently Asked Questions section of the [employer] website.  Remember, [employer] is NOT to be used for urgent needs. For medical emergencies, dial 911. Now available from your iPhone and Android! Please provide this summary of care documentation to your next provider. Your primary care clinician is listed as Mabel Faoroq. If you have any questions after today's visit, please call [phone].

## 2017-12-13 LAB
ALBUMIN SERPL-MCNC: 4.4 G/DL (ref 3.6–4.8)
ALP SERPL-CCNC: 94 IU/L (ref 39–117)
ALT SERPL-CCNC: 23 IU/L (ref 0–32)
AST SERPL-CCNC: 20 IU/L (ref 0–40)
BILIRUB DIRECT SERPL-MCNC: 0.09 MG/DL (ref 0–0.4)
BILIRUB SERPL-MCNC: 0.3 MG/DL (ref 0–1.2)
PROT SERPL-MCNC: 7.4 G/DL (ref 6–8.5)

## 2017-12-14 LAB — HCV RNA SERPL QL NAA+PROBE: NEGATIVE

## 2021-08-20 ENCOUNTER — APPOINTMENT (OUTPATIENT)
Dept: CT IMAGING | Age: 66
End: 2021-08-20
Attending: NURSE PRACTITIONER
Payer: MEDICARE

## 2021-08-20 ENCOUNTER — HOSPITAL ENCOUNTER (EMERGENCY)
Age: 66
Discharge: HOME | End: 2021-08-20
Attending: EMERGENCY MEDICINE
Payer: MEDICARE

## 2021-08-20 VITALS
TEMPERATURE: 97.3 F | OXYGEN SATURATION: 97 % | BODY MASS INDEX: 31.39 KG/M2 | HEART RATE: 66 BPM | WEIGHT: 200 LBS | DIASTOLIC BLOOD PRESSURE: 91 MMHG | HEIGHT: 67 IN | SYSTOLIC BLOOD PRESSURE: 158 MMHG | RESPIRATION RATE: 16 BRPM

## 2021-08-20 DIAGNOSIS — N20.0 KIDNEY STONE: ICD-10-CM

## 2021-08-20 DIAGNOSIS — M51.36 DEGENERATIVE DISC DISEASE, LUMBAR: Primary | ICD-10-CM

## 2021-08-20 LAB
ALBUMIN SERPL-MCNC: 3.9 G/DL (ref 3.5–5)
ALBUMIN/GLOB SERPL: 1 {RATIO} (ref 1.1–2.2)
ALP SERPL-CCNC: 89 U/L (ref 45–117)
ALT SERPL-CCNC: 30 U/L (ref 12–78)
ANION GAP SERPL CALC-SCNC: 7 MMOL/L (ref 5–15)
APPEARANCE UR: CLEAR
AST SERPL-CCNC: 13 U/L (ref 15–37)
BACTERIA URNS QL MICRO: NEGATIVE /HPF
BASOPHILS # BLD: 0 K/UL (ref 0–0.1)
BASOPHILS NFR BLD: 0 % (ref 0–1)
BILIRUB SERPL-MCNC: 0.4 MG/DL (ref 0.2–1)
BILIRUB UR QL: NEGATIVE
BUN SERPL-MCNC: 12 MG/DL (ref 6–20)
BUN/CREAT SERPL: 21 (ref 12–20)
CALCIUM SERPL-MCNC: 8.8 MG/DL (ref 8.5–10.1)
CHLORIDE SERPL-SCNC: 104 MMOL/L (ref 97–108)
CO2 SERPL-SCNC: 26 MMOL/L (ref 21–32)
COLOR UR: ABNORMAL
COMMENT, HOLDF: NORMAL
CREAT SERPL-MCNC: 0.57 MG/DL (ref 0.55–1.02)
DIFFERENTIAL METHOD BLD: NORMAL
EOSINOPHIL # BLD: 0.3 K/UL (ref 0–0.4)
EOSINOPHIL NFR BLD: 3 % (ref 0–7)
EPITH CASTS URNS QL MICRO: ABNORMAL /LPF
ERYTHROCYTE [DISTWIDTH] IN BLOOD BY AUTOMATED COUNT: 12.9 % (ref 11.5–14.5)
GLOBULIN SER CALC-MCNC: 3.9 G/DL (ref 2–4)
GLUCOSE SERPL-MCNC: 98 MG/DL (ref 65–100)
GLUCOSE UR STRIP.AUTO-MCNC: NEGATIVE MG/DL
HCT VFR BLD AUTO: 38.5 % (ref 35–47)
HGB BLD-MCNC: 13 G/DL (ref 11.5–16)
HGB UR QL STRIP: NEGATIVE
HYALINE CASTS URNS QL MICRO: ABNORMAL /LPF (ref 0–5)
IMM GRANULOCYTES # BLD AUTO: 0 K/UL (ref 0–0.04)
IMM GRANULOCYTES NFR BLD AUTO: 0 % (ref 0–0.5)
KETONES UR QL STRIP.AUTO: NEGATIVE MG/DL
LEUKOCYTE ESTERASE UR QL STRIP.AUTO: ABNORMAL
LIPASE SERPL-CCNC: 101 U/L (ref 73–393)
LYMPHOCYTES # BLD: 2.3 K/UL (ref 0.8–3.5)
LYMPHOCYTES NFR BLD: 24 % (ref 12–49)
MCH RBC QN AUTO: 32 PG (ref 26–34)
MCHC RBC AUTO-ENTMCNC: 33.8 G/DL (ref 30–36.5)
MCV RBC AUTO: 94.8 FL (ref 80–99)
MONOCYTES # BLD: 0.8 K/UL (ref 0–1)
MONOCYTES NFR BLD: 8 % (ref 5–13)
NEUTS SEG # BLD: 6 K/UL (ref 1.8–8)
NEUTS SEG NFR BLD: 65 % (ref 32–75)
NITRITE UR QL STRIP.AUTO: NEGATIVE
NRBC # BLD: 0 K/UL (ref 0–0.01)
NRBC BLD-RTO: 0 PER 100 WBC
PH UR STRIP: 7 [PH] (ref 5–8)
PLATELET # BLD AUTO: 301 K/UL (ref 150–400)
PMV BLD AUTO: 8.9 FL (ref 8.9–12.9)
POTASSIUM SERPL-SCNC: 4 MMOL/L (ref 3.5–5.1)
PROT SERPL-MCNC: 7.8 G/DL (ref 6.4–8.2)
PROT UR STRIP-MCNC: NEGATIVE MG/DL
RBC # BLD AUTO: 4.06 M/UL (ref 3.8–5.2)
RBC #/AREA URNS HPF: ABNORMAL /HPF (ref 0–5)
SAMPLES BEING HELD,HOLD: NORMAL
SODIUM SERPL-SCNC: 137 MMOL/L (ref 136–145)
SP GR UR REFRACTOMETRY: 1.01 (ref 1–1.03)
UR CULT HOLD, URHOLD: NORMAL
UROBILINOGEN UR QL STRIP.AUTO: 0.2 EU/DL (ref 0.2–1)
WBC # BLD AUTO: 9.4 K/UL (ref 3.6–11)
WBC URNS QL MICRO: ABNORMAL /HPF (ref 0–4)

## 2021-08-20 PROCEDURE — 83690 ASSAY OF LIPASE: CPT

## 2021-08-20 PROCEDURE — 74011250636 HC RX REV CODE- 250/636: Performed by: NURSE PRACTITIONER

## 2021-08-20 PROCEDURE — 85025 COMPLETE CBC W/AUTO DIFF WBC: CPT

## 2021-08-20 PROCEDURE — 80053 COMPREHEN METABOLIC PANEL: CPT

## 2021-08-20 PROCEDURE — 99282 EMERGENCY DEPT VISIT SF MDM: CPT

## 2021-08-20 PROCEDURE — 36415 COLL VENOUS BLD VENIPUNCTURE: CPT

## 2021-08-20 PROCEDURE — 96374 THER/PROPH/DIAG INJ IV PUSH: CPT

## 2021-08-20 PROCEDURE — 74176 CT ABD & PELVIS W/O CONTRAST: CPT

## 2021-08-20 PROCEDURE — 81001 URINALYSIS AUTO W/SCOPE: CPT

## 2021-08-20 RX ORDER — HYDROCODONE BITARTRATE AND ACETAMINOPHEN 5; 325 MG/1; MG/1
1 TABLET ORAL
Qty: 10 TABLET | Refills: 0 | Status: SHIPPED | OUTPATIENT
Start: 2021-08-20 | End: 2021-08-23

## 2021-08-20 RX ORDER — CYCLOBENZAPRINE HCL 10 MG
10 TABLET ORAL
Status: DISCONTINUED | OUTPATIENT
Start: 2021-08-20 | End: 2021-08-20 | Stop reason: HOSPADM

## 2021-08-20 RX ORDER — CYCLOBENZAPRINE HCL 10 MG
10 TABLET ORAL
Qty: 15 TABLET | Refills: 0 | Status: SHIPPED | OUTPATIENT
Start: 2021-08-20

## 2021-08-20 RX ORDER — KETOROLAC TROMETHAMINE 30 MG/ML
15 INJECTION, SOLUTION INTRAMUSCULAR; INTRAVENOUS
Status: DISCONTINUED | OUTPATIENT
Start: 2021-08-20 | End: 2021-08-20 | Stop reason: HOSPADM

## 2021-08-20 RX ORDER — METHYLPREDNISOLONE 4 MG/1
TABLET ORAL
Qty: 1 DOSE PACK | Refills: 0 | Status: SHIPPED | OUTPATIENT
Start: 2021-08-20

## 2021-08-20 RX ORDER — KETOROLAC TROMETHAMINE 30 MG/ML
15 INJECTION, SOLUTION INTRAMUSCULAR; INTRAVENOUS ONCE
Status: COMPLETED | OUTPATIENT
Start: 2021-08-20 | End: 2021-08-20

## 2021-08-20 RX ADMIN — SODIUM CHLORIDE 1000 ML: 9 INJECTION, SOLUTION INTRAVENOUS at 09:05

## 2021-08-20 RX ADMIN — KETOROLAC TROMETHAMINE 15 MG: 30 INJECTION, SOLUTION INTRAMUSCULAR; INTRAVENOUS at 09:05

## 2021-08-20 NOTE — Clinical Note
[address]  [facility] EMERGENCY DEPT  [address] [phone]  [phone]    Work/School Note    Date: 8/20/2021    To Whom It May concern:    Livia Banerjee was seen and treated today in the emergency room by the following provider(s):  Attending Provider: Ej Chavez MD  Nurse Practitioner: Jerry Piña NP. Livia Banerjee is excused from work/school on 8/20/2021 through 8/23/2021. She is medically clear to return to work/school on 8/24/2021.         Sincerely,          Loraine Epps NP

## 2021-08-20 NOTE — ED TRIAGE NOTES
Lower back ache x 2 days, today she woke up with difficulty to ambulate. Pain is on left lower back and wraps around flank to front of abdomen. Has not taken any medications at home.

## 2021-08-20 NOTE — ED PROVIDER NOTES
This is a 56-year-old female who presents by way of wheelchair to the emergency room with complaints of lower back pain, left flank pain radiating into the left groin. Patient initially thought that she was just having muscle spasms from her back however now states that her pain is reminiscent of a prior kidney stone in the past.  Patient has not taken any medication prior to arrival in the emergency room. States she came today because she is unable to stand and walk secondary to the pain. Adds that her pain waxes and wanes and at times feels sharp. Denies any trauma. Denies any chest pain, shortness of breath, dizziness, nausea or vomiting, fevers or chills. Denies any loss of sensation for bowel or bladder, bowel or bladder incontinence. No urinary urgency or frequency, hematuria. Last bowel movement was today with no blood noted. There are no further complaints at this time. Yumiko Farooq MD  No past medical history on file. No past surgical history on file. No past medical history on file. No past surgical history on file. No family history on file.     Social History     Socioeconomic History    Marital status:      Spouse name: Not on file    Number of children: Not on file    Years of education: Not on file    Highest education level: Not on file   Occupational History    Not on file   Tobacco Use    Smoking status: Former Smoker    Smokeless tobacco: Never Used   Substance and Sexual Activity    Alcohol use: Yes     Comment: occ    Drug use: Not on file    Sexual activity: Not on file   Other Topics Concern    Not on file   Social History Narrative    Not on file     Social Determinants of Health     Financial Resource Strain:     Difficulty of Paying Living Expenses:    Food Insecurity:     Worried About [address] in the Last Year:     [address] in the Last Year:    Transportation Needs:     Lack of Transportation (Medical):    Nereyda Enriquez Lack of Transportation (Non-Medical):    Physical Activity:     Days of Exercise per Week:     Minutes of Exercise per Session:    Stress:     Feeling of Stress :    Social Connections:     Frequency of Communication with Friends and Family:     Frequency of Social Gatherings with Friends and Family:     Attends Yazidism Services:     Active Member of Clubs or Organizations:     Attends Club or Organization Meetings:     Marital Status:    Intimate Partner Violence:     Fear of Current or Ex-Partner:     Emotionally Abused:     Physically Abused:     Sexually Abused: ALLERGIES: Latex, Clindamycin, and Sulfa (sulfonamide antibiotics)    Review of Systems   Constitutional: Negative for appetite change, chills, diaphoresis, fatigue and fever. HENT: Negative for congestion, ear discharge, ear pain, sinus pressure, sinus pain, sore throat and trouble swallowing. Eyes: Negative for photophobia, pain, redness and visual disturbance. Respiratory: Negative for chest tightness, shortness of breath and wheezing. Cardiovascular: Negative for chest pain and palpitations. Gastrointestinal: Negative for abdominal distention, abdominal pain, nausea and vomiting. Endocrine: Negative. Genitourinary: Positive for flank pain (left radiating into the left groin). Negative for difficulty urinating, frequency and urgency. Musculoskeletal: Positive for back pain (lumbar). Negative for neck pain and neck stiffness. Skin: Negative for color change, pallor, rash and wound. Allergic/Immunologic: Negative. Neurological: Negative for dizziness, speech difficulty, weakness and headaches. Hematological: Does not bruise/bleed easily. Psychiatric/Behavioral: Negative for behavioral problems. The patient is not nervous/anxious.         Vitals:    08/20/21 0829   BP: (!) 158/91   Pulse: 66   Resp: 16   Temp: 97.3 °F (36.3 °C)   SpO2: 97%   Weight: 90.7 kg (200 lb)   Height: 5' 7\" (1.702 m) Physical Exam  Vitals and nursing note reviewed. Constitutional:       General: She is not in acute distress. Appearance: Normal appearance. She is well-developed. She is not ill-appearing. HENT:      Head: Normocephalic and atraumatic. Right Ear: External ear normal.      Left Ear: External ear normal.      Nose: Nose normal.      Mouth/Throat:      Mouth: Mucous membranes are moist.   Eyes:      General:         Right eye: No discharge. Left eye: No discharge. Conjunctiva/sclera: Conjunctivae normal.      Pupils: Pupils are equal, round, and reactive to light. Neck:      Vascular: No JVD. Trachea: No tracheal deviation. Cardiovascular:      Rate and Rhythm: Normal rate and regular rhythm. Pulses: Normal pulses. Heart sounds: Normal heart sounds. No murmur heard. No gallop. Pulmonary:      Effort: Pulmonary effort is normal. No respiratory distress. Breath sounds: Normal breath sounds. No wheezing or rales. Chest:      Chest wall: No tenderness. Abdominal:      General: Bowel sounds are normal. There is no distension. Palpations: Abdomen is soft. Tenderness: There is no abdominal tenderness. There is left CVA tenderness. There is no guarding or rebound. Genitourinary:     Comments: Negative, no loss of sensation for bowel or bladder, no bowel or bladder incontinence. Musculoskeletal:         General: Tenderness (right paraspinous pain, lumbar spine) present. Normal range of motion. Cervical back: Normal range of motion and neck supple. Comments: Lumbar spine with no step offs, no deformities. Skin:     General: Skin is warm and dry. Capillary Refill: Capillary refill takes less than 2 seconds. Coloration: Skin is not pale. Findings: No erythema or rash. Neurological:      General: No focal deficit present. Mental Status: She is alert and oriented to person, place, and time. Motor: No weakness. Coordination: Coordination normal.   Psychiatric:         Mood and Affect: Mood normal.         Behavior: Behavior normal.         Thought Content: Thought content normal.         Judgment: Judgment normal.          MDM  Number of Diagnoses or Management Options  Degenerative disc disease, lumbar: new and requires workup  Kidney stone: new and requires workup  Diagnosis management comments: Differential diagnosis includes musculoskeletal pain, degenerative disc disease, kidney stone and others. After physical assessment, review of imaging and laboratory data, patient was discharged home and will follow up with PCP. Follow-up with [facility] as an outpatient. Return to the emergency room with worsening symptoms. Patient in agreement with plan of care. Amount and/or Complexity of Data Reviewed  Clinical lab tests: ordered and reviewed  Tests in the radiology section of CPT®: ordered and reviewed  Discuss the patient with other providers: yes (Dr. Eber Ramos)         Labs Reviewed   METABOLIC PANEL, COMPREHENSIVE - Abnormal; Notable for the following components:       Result Value    BUN/Creatinine ratio 21 (*)     AST (SGOT) 13 (*)     A-G Ratio 1.0 (*)     All other components within normal limits   URINALYSIS W/MICROSCOPIC - Abnormal; Notable for the following components:    Leukocyte Esterase TRACE (*)     All other components within normal limits   URINE CULTURE HOLD SAMPLE   CBC WITH AUTOMATED DIFF   LIPASE   SAMPLES BEING HELD     CT ABD PELV WO CONT    Result Date: 8/20/2021  1. There is a nonobstructing calculus in each kidney. No hydroureteronephrosis or ureteral calculi are identified. 10:44 AM  Pt has been reexamined. Pt has no new complaints, changes or physical findings. Care plan outlined and precautions discussed. All available results were reviewed with pt. All medications were reviewed with pt. All of pt's questions and concerns were addressed.  Pt agrees to F/U as instructed and agrees to return to ED upon further deterioration. Pt is ready to go home. Alvarez Fitzpatrick NP    Please note that this dictation was completed with [employer], the computer voice recognition software. Quite often unanticipated grammatical, syntax, homophones, and other interpretive errors are inadvertently transcribed by the computer software. Please disregard these errors. Please excuse any errors that have escaped final proofreading. Thank you.     Procedures

## 2022-03-18 PROBLEM — I10 HYPERTENSION: Status: ACTIVE | Noted: 2017-04-28

## 2022-03-19 PROBLEM — B18.2 CHRONIC HEPATITIS C WITHOUT HEPATIC COMA (HCC): Status: ACTIVE | Noted: 2017-05-25

## 2022-03-20 PROBLEM — N20.0 KIDNEY STONES: Status: ACTIVE | Noted: 2017-04-28

## 2022-12-17 ENCOUNTER — APPOINTMENT (OUTPATIENT)
Dept: CT IMAGING | Age: 67
End: 2022-12-17
Attending: EMERGENCY MEDICINE
Payer: MEDICARE

## 2022-12-17 ENCOUNTER — APPOINTMENT (OUTPATIENT)
Dept: GENERAL RADIOLOGY | Age: 67
End: 2022-12-17
Attending: EMERGENCY MEDICINE
Payer: MEDICARE

## 2022-12-17 ENCOUNTER — HOSPITAL ENCOUNTER (EMERGENCY)
Age: 67
Discharge: HOME | End: 2022-12-17
Attending: EMERGENCY MEDICINE
Payer: MEDICARE

## 2022-12-17 VITALS
DIASTOLIC BLOOD PRESSURE: 94 MMHG | OXYGEN SATURATION: 94 % | RESPIRATION RATE: 24 BRPM | BODY MASS INDEX: 30.53 KG/M2 | SYSTOLIC BLOOD PRESSURE: 212 MMHG | WEIGHT: 190 LBS | HEART RATE: 80 BPM | TEMPERATURE: 98.1 F | HEIGHT: 66 IN

## 2022-12-17 DIAGNOSIS — J10.1 INFLUENZA A: Primary | ICD-10-CM

## 2022-12-17 LAB
ALBUMIN SERPL-MCNC: 4.1 G/DL (ref 3.5–5)
ALBUMIN/GLOB SERPL: 1 {RATIO} (ref 1.1–2.2)
ALP SERPL-CCNC: 83 U/L (ref 45–117)
ALT SERPL-CCNC: 28 U/L (ref 12–78)
ANION GAP SERPL CALC-SCNC: 10 MMOL/L (ref 5–15)
AST SERPL-CCNC: 15 U/L (ref 15–37)
BASOPHILS # BLD: 0 K/UL (ref 0–0.1)
BASOPHILS NFR BLD: 0 % (ref 0–1)
BILIRUB SERPL-MCNC: 0.4 MG/DL (ref 0.2–1)
BNP SERPL-MCNC: 472 PG/ML
BUN SERPL-MCNC: 9 MG/DL (ref 6–20)
BUN/CREAT SERPL: 14 (ref 12–20)
CALCIUM SERPL-MCNC: 9.5 MG/DL (ref 8.5–10.1)
CHLORIDE SERPL-SCNC: 91 MMOL/L (ref 97–108)
CO2 SERPL-SCNC: 27 MMOL/L (ref 21–32)
COMMENT, HOLDF: NORMAL
CREAT SERPL-MCNC: 0.63 MG/DL (ref 0.55–1.02)
DIFFERENTIAL METHOD BLD: ABNORMAL
EOSINOPHIL # BLD: 0.1 K/UL (ref 0–0.4)
EOSINOPHIL NFR BLD: 1 % (ref 0–7)
ERYTHROCYTE [DISTWIDTH] IN BLOOD BY AUTOMATED COUNT: 12.3 % (ref 11.5–14.5)
FLUAV AG NPH QL IA: POSITIVE
FLUBV AG NOSE QL IA: NEGATIVE
GLOBULIN SER CALC-MCNC: 4 G/DL (ref 2–4)
GLUCOSE SERPL-MCNC: 109 MG/DL (ref 65–100)
HCT VFR BLD AUTO: 37.5 % (ref 35–47)
HGB BLD-MCNC: 13.3 G/DL (ref 11.5–16)
IMM GRANULOCYTES # BLD AUTO: 0 K/UL (ref 0–0.04)
IMM GRANULOCYTES NFR BLD AUTO: 0 % (ref 0–0.5)
LYMPHOCYTES # BLD: 0.7 K/UL (ref 0.8–3.5)
LYMPHOCYTES NFR BLD: 9 % (ref 12–49)
MCH RBC QN AUTO: 32.1 PG (ref 26–34)
MCHC RBC AUTO-ENTMCNC: 35.5 G/DL (ref 30–36.5)
MCV RBC AUTO: 90.6 FL (ref 80–99)
MONOCYTES # BLD: 1 K/UL (ref 0–1)
MONOCYTES NFR BLD: 12 % (ref 5–13)
NEUTS SEG # BLD: 6.3 K/UL (ref 1.8–8)
NEUTS SEG NFR BLD: 78 % (ref 32–75)
NRBC # BLD: 0 K/UL (ref 0–0.01)
NRBC BLD-RTO: 0 PER 100 WBC
PLATELET # BLD AUTO: 315 K/UL (ref 150–400)
PMV BLD AUTO: 9.1 FL (ref 8.9–12.9)
POTASSIUM SERPL-SCNC: 3.5 MMOL/L (ref 3.5–5.1)
PROT SERPL-MCNC: 8.1 G/DL (ref 6.4–8.2)
RBC # BLD AUTO: 4.14 M/UL (ref 3.8–5.2)
RBC MORPH BLD: ABNORMAL
SAMPLES BEING HELD,HOLD: NORMAL
SODIUM SERPL-SCNC: 128 MMOL/L (ref 136–145)
TROPONIN-HIGH SENSITIVITY: 8 NG/L (ref 0–51)
WBC # BLD AUTO: 8.1 K/UL (ref 3.6–11)

## 2022-12-17 PROCEDURE — 74011250636 HC RX REV CODE- 250/636: Performed by: EMERGENCY MEDICINE

## 2022-12-17 PROCEDURE — 71046 X-RAY EXAM CHEST 2 VIEWS: CPT

## 2022-12-17 PROCEDURE — 99284 EMERGENCY DEPT VISIT MOD MDM: CPT

## 2022-12-17 PROCEDURE — 70450 CT HEAD/BRAIN W/O DYE: CPT

## 2022-12-17 PROCEDURE — 87804 INFLUENZA ASSAY W/OPTIC: CPT

## 2022-12-17 PROCEDURE — 96375 TX/PRO/DX INJ NEW DRUG ADDON: CPT

## 2022-12-17 PROCEDURE — 74011000250 HC RX REV CODE- 250: Performed by: EMERGENCY MEDICINE

## 2022-12-17 PROCEDURE — 36415 COLL VENOUS BLD VENIPUNCTURE: CPT

## 2022-12-17 PROCEDURE — 94640 AIRWAY INHALATION TREATMENT: CPT

## 2022-12-17 PROCEDURE — 84484 ASSAY OF TROPONIN QUANT: CPT

## 2022-12-17 PROCEDURE — 85025 COMPLETE CBC W/AUTO DIFF WBC: CPT

## 2022-12-17 PROCEDURE — 96374 THER/PROPH/DIAG INJ IV PUSH: CPT

## 2022-12-17 PROCEDURE — 83880 ASSAY OF NATRIURETIC PEPTIDE: CPT

## 2022-12-17 PROCEDURE — 80053 COMPREHEN METABOLIC PANEL: CPT

## 2022-12-17 RX ORDER — ALBUTEROL SULFATE 90 UG/1
2 AEROSOL, METERED RESPIRATORY (INHALATION)
Qty: 8 G | Refills: 0 | Status: SHIPPED | OUTPATIENT
Start: 2022-12-17 | End: 2023-01-16

## 2022-12-17 RX ORDER — DIPHENHYDRAMINE HYDROCHLORIDE 50 MG/ML
50 INJECTION, SOLUTION INTRAMUSCULAR; INTRAVENOUS ONCE
Status: COMPLETED | OUTPATIENT
Start: 2022-12-17 | End: 2022-12-17

## 2022-12-17 RX ORDER — IPRATROPIUM BROMIDE AND ALBUTEROL SULFATE 2.5; .5 MG/3ML; MG/3ML
3 SOLUTION RESPIRATORY (INHALATION)
Status: COMPLETED | OUTPATIENT
Start: 2022-12-17 | End: 2022-12-17

## 2022-12-17 RX ORDER — PREDNISONE 20 MG/1
40 TABLET ORAL DAILY
Qty: 10 TABLET | Refills: 0 | Status: SHIPPED | OUTPATIENT
Start: 2022-12-17 | End: 2022-12-22

## 2022-12-17 RX ORDER — KETOROLAC TROMETHAMINE 30 MG/ML
30 INJECTION, SOLUTION INTRAMUSCULAR; INTRAVENOUS
Status: COMPLETED | OUTPATIENT
Start: 2022-12-17 | End: 2022-12-17

## 2022-12-17 RX ORDER — PROCHLORPERAZINE EDISYLATE 5 MG/ML
10 INJECTION INTRAMUSCULAR; INTRAVENOUS ONCE
Status: COMPLETED | OUTPATIENT
Start: 2022-12-17 | End: 2022-12-17

## 2022-12-17 RX ADMIN — DIPHENHYDRAMINE HYDROCHLORIDE 50 MG: 50 INJECTION, SOLUTION INTRAMUSCULAR; INTRAVENOUS at 13:44

## 2022-12-17 RX ADMIN — KETOROLAC TROMETHAMINE 30 MG: 30 INJECTION, SOLUTION INTRAMUSCULAR; INTRAVENOUS at 16:02

## 2022-12-17 RX ADMIN — PROCHLORPERAZINE EDISYLATE 10 MG: 5 INJECTION INTRAMUSCULAR; INTRAVENOUS at 13:43

## 2022-12-17 RX ADMIN — IPRATROPIUM BROMIDE AND ALBUTEROL SULFATE 3 ML: .5; 3 SOLUTION RESPIRATORY (INHALATION) at 15:51

## 2022-12-17 NOTE — ED PROVIDER NOTES
60-year-old female presents with multiple complaints. On Thursday she started to have a headache and cough. Her cough has been productive of clear sputum. She endorses shortness of breath. She also states that she has had intermittent chest pressure. She denies aggravating or alleviating factors for her chest pain. She is currently pain-free. She endorses associated nausea. She has a very remote history of asthma and was previously taking albuterol. She does have a history of hypertension and did take her blood pressure medication today. No past medical history on file. No past surgical history on file. No family history on file. Social History     Socioeconomic History    Marital status:      Spouse name: Not on file    Number of children: Not on file    Years of education: Not on file    Highest education level: Not on file   Occupational History    Not on file   Tobacco Use    Smoking status: Former    Smokeless tobacco: Never   Substance and Sexual Activity    Alcohol use: Yes     Comment: occ    Drug use: Not on file    Sexual activity: Not on file   Other Topics Concern    Not on file   Social History Narrative    Not on file     Social Determinants of Health     Financial Resource Strain: Not on file   Food Insecurity: Not on file   Transportation Needs: Not on file   Physical Activity: Not on file   Stress: Not on file   Social Connections: Not on file   Intimate Partner Violence: Not on file   Housing Stability: Not on file         ALLERGIES: Latex, Clindamycin, and Sulfa (sulfonamide antibiotics)    Review of Systems   Constitutional:  Negative for fever. HENT:  Negative for rhinorrhea. Respiratory:  Positive for cough and shortness of breath. Cardiovascular:  Positive for chest pain. Gastrointestinal:  Negative for abdominal pain. Genitourinary:  Negative for dysuria. Musculoskeletal:  Negative for back pain. Skin:  Negative for wound.    Neurological: Positive for headaches. Psychiatric/Behavioral:  Negative for confusion. Vitals:    12/17/22 1304   BP: (!) 212/94   Pulse: 80   Resp: 24   Temp: 98.1 °F (36.7 °C)   SpO2: 94%   Weight: 86.2 kg (190 lb)   Height: 5' 6\" (1.676 m)            Physical Exam  Vitals and nursing note reviewed. Constitutional:       General: She is not in acute distress. Appearance: Normal appearance. She is well-developed. She is not ill-appearing, toxic-appearing or diaphoretic. HENT:      Head: Normocephalic and atraumatic. Eyes:      Extraocular Movements: Extraocular movements intact. Cardiovascular:      Rate and Rhythm: Normal rate. Pulses: Normal pulses. Pulmonary:      Effort: Pulmonary effort is normal. No respiratory distress. Breath sounds: Wheezing present. Abdominal:      General: There is no distension. Palpations: Abdomen is soft. Musculoskeletal:         General: Normal range of motion. Cervical back: Normal range of motion. Skin:     General: Skin is dry. Neurological:      General: No focal deficit present. Mental Status: She is alert and oriented to person, place, and time. Psychiatric:         Mood and Affect: Mood normal.        MDM  Number of Diagnoses or Management Options  Influenza A  Diagnosis management comments:   Patient presents with multiple complaints including cough and headache. She does have some wheezing on exam.  Pneumonia is certainly a possibility along with influenza. Chest x-ray shows no acute process. Troponin normal.  BNP unremarkable. White blood cell count normal.  Liver and renal function normal.  Influenza positive. Will discharge on steroids and albuterol inhaler given initial wheezing on exam.  Discussed my clinical impression(s), any labs and/or radiology results with the patient. I answered any questions and addressed any concerns. Discussed the importance of following up with their primary care physician and/or specialist(s). Discussed signs or symptoms that would warrant return back to the ER for further evaluation. The patient is agreeable with discharge.            Procedures

## 2023-05-17 RX ORDER — METHYLPREDNISOLONE 4 MG/1
TABLET ORAL
COMMUNITY
Start: 2021-08-20

## 2023-05-17 RX ORDER — CYCLOBENZAPRINE HCL 10 MG
10 TABLET ORAL 3 TIMES DAILY PRN
COMMUNITY
Start: 2021-08-20

## 2023-05-17 RX ORDER — ALPRAZOLAM 0.25 MG/1
0.25 TABLET ORAL DAILY PRN
COMMUNITY
Start: 2017-03-23

## 2023-05-17 RX ORDER — LOSARTAN POTASSIUM AND HYDROCHLOROTHIAZIDE 25; 100 MG/1; MG/1
1 TABLET ORAL DAILY
COMMUNITY
Start: 2017-04-25